# Patient Record
(demographics unavailable — no encounter records)

---

## 2017-06-06 NOTE — [facility]
MR of the right ankle

 

HISTORY: Right Achilles pain for 2 years.

 

TECHNIQUE: Routine multiplanar sequences are obtained.

 

FINDINGS:

Moderate thickening of the distal Achilles tendon and insertion. Small 

partial tear of the deep attachment, involving 20 percent of fibers. No 

large tear or rupture. Mild retrocalcaneal bursal fluid. Mild edema within

the pre-Achilles fat and around the tendon.

 

Longitudinal split tear of the peroneus brevis tendon at the level of the 

lateral malleolus without fluid or edema.

 

Anterior talofibular ligament, calcaneofibular ligament and posterior 

talofibular ligament demonstrate changes of mild scarring but no acute 

tear or laxity. Anterior inferior tibiofibular ligament is intact.

 

Posterior tibial and flexor tendons are intact. Scarring of the deep 

fibers of the deltoid ligament.

 

Anterior tibial and extensor tendons are intact.

 

No acute plantar fasciitis.

 

Subtalar joints are patent. Tarsal sinus intact. Talar dome is intact.

 

No significant joint effusion.

 

IMPRESSION:

1. Moderate insertional Achilles tendinosis with a small partial tear at 

the deep attachment.

2. Scarring of lateral and medial ankle ligaments.

3. Longitudinal split tear of the peroneus brevis tendon.

 

Electronically signed by: Aiden Morales MD (6/6/2017 1:56 PM)

## 2017-11-27 NOTE — [facility]
Bilateral digital screening mammograms:

 

Reason for examination: Routine screening.

 

Comparison is made to previous studies dated 11/20/2013 and 10/21/2010.

 

Interpretation was made with the benefit of CAD.

 

The skin and nipples show no abnormalities. No abnormal axillary lymph 

nodes are seen. The breast parenchyma shows scattered fibroglandular 

density. (Breast density: Category B.) There are no dominant masses, 

suspicious calcifications or architectural distortions.

 

Impression:

 

No evidence of malignancy. Recommend routine screening.

 

BI-RADS Category 1:  Negative.

 

"Our facility is accredited by the [employer] 

Mammography Program."

 

This patient's information has been entered into a reminder system for the

patient to be notified with the results of her examination and a target 

date for the next mammogram.

 

Electronically signed by: Sunshine Meadows MD (11/27/2017 3:47 PM) [id number]

## 2018-11-20 NOTE — DISCH
DISCHARGE INSTRUCTIONS


Condition on Discharge


Condition on Discharge:  Stable





Activity After Discharge


Activity Instructions for Disc:  Walk in house, Other, see below (no 

weightbearing but may wiggle toes and right ankle gently)


Bathing Instructions:  Shower-keep dressing dry, No Tub Bath until see 


Lifting Instructions after Dis:  No heavy lifting, No pulling or pushing, Do 

not lift >10 pounds


Exercise Instruction after Dis:  Exercise per therapy, Progress as tolerated


Driving Instructions after Dis:  Do not drive


Weight Bearing Status after Di:  No restrictions, Full weight bearing, As 

tolerated





Diet after Discharge


Diet after Discharge:  Regular


Diet Texture:  Regular


Swallowing Supervision:  None needed





Wound Incision Care


Wound/Incision Care:  Ice to area for comfort, Do not change dressing





Contacting the DRBhanu after DC


Call your doctor for:  Concerns you may have





Follow-Up


Follow up with:  Rose 1 week, expect incision check and transition into a 

walker boot





Treatment/Equipment after DC


Adaptive Equipment Issued:  None, Crutches











SIMI DAVIDSON MD Nov 20, 2018 11:03

## 2018-11-20 NOTE — PDOC4
Operative Note


Operative Note


Date of surgery: 11/20/2018





Preoperative diagnosis: Right heel pain and spur with partial thickness 

Achilles tear





Postoperative diagnosis: Same





Operative procedure: Debridement of right heel spur and Achilles tendon repair





Surgeon: Rose





Assist: Franklyn Barahona NP





Anesthesia: Gen.





Estimated blood loss: 2 mL





Complications: None





Operative indications: Emil is a 66-year-old female with right heel pain MRI 

showing a large heel spur and partial thickness Achilles tear. She is been very 

symptomatic and limited in her activities of daily living unresponsive to 

nonoperative treatment. We had discussed nonoperative and operative treatment 

options of the ongoing above issues. She wishes more definitive treatment due 

to the limitations that she is facing. I described to the possibility of 

removing the excess bone the could be irritating the Achilles and repairing the 

partial thickness tear that that is present. We talked about the recovery 

process the possibility of nerve or blood vessel damage infection difficulties 

with a long healing course medical or other anesthetic complications among 

others all her questions were answered she wishes to proceed with operative 

evaluation and treatment.





Operative text: Patient was identified procedure verified patient placed in the 

supine position on the operating table. After adequate amounts of general 

anesthesia were administered she was placed in the prone position and all bony 

prominences were well-padded and the right lower extremity was prepped and 

draped in standard sterile fashion. After timeout was performed patient 

procedure identified and verified right lower extremity was exsanguinated 

tourniquet inflated to 300 mmHg midline incision was made over the posterior 

aspect of the right heel. This was centered over the prominence posteriorly. 

The Achilles tendon was split longitudinally at its midline. Large overhanging 

spur was excised with a sagittal saw and surrounding bony osteophytes removed 

with the rongeur. Likewise devitalized tendon in this area was trimmed back to 

stable tissue sharply with scalpel and rongeur. With x-ray revealing removal of 

the calcaneus spur superiorly and bone prepared to a good bleeding surface, a #

2 Max braid suture was woven to affect a repair at the Achilles insertion on 

the proximal calcaneus. A [place] self-tapping anchor was tapped and 

placed into the bone and resulted in excellent apposition of the Achilles 

insertion proximally on the calcaneus. Remainder of the tendon was repaired 

longitudinally with Max braid sutures in a buried fashion to avoid any 

irritation posteriorly with knots. Excellent repair was noted throughout her 

range of motion thorough irrigation carried out normal saline solution 

subcutaneous closure with buried Vicryl sutures skin closure with Monocryl 

suture sterile soft dressings were applied patient was returned recovery room 

in stable condition having tolerated procedure well toes were noted be warm 

pink following deflation of the tourniquet Franklyn Barahona nurse practitioner was 

present for the procedure assisted with prepping draping retraction repair and 

skin closure











SIMI DAVIDSON MD Nov 20, 2018 20:18

## 2018-11-22 NOTE — RAD
EXAM: Right ankle, single view.

 

HISTORY: Achilles tendon repair.

 

COMPARISON: None.

 

FINDINGS: A single lateral view of the right ankle is obtained. There is 

soft tissue gas at the Achilles tendon insertion due to intraoperative 

imaging during Achilles tendon surgery. There is a tiny plantar spur. 

There is enthesopathy at the Achilles tendon insertion.

 

IMPRESSION: Lateral view of the right ankle for operative guidance during 

a reported Achilles tendon repair.

 

Electronically signed by: Jimena Newell MD (11/22/2018 3:09 PM) [facility]